# Patient Record
Sex: MALE | Race: BLACK OR AFRICAN AMERICAN | URBAN - METROPOLITAN AREA
[De-identification: names, ages, dates, MRNs, and addresses within clinical notes are randomized per-mention and may not be internally consistent; named-entity substitution may affect disease eponyms.]

---

## 2019-02-06 ENCOUNTER — OFFICE VISIT (OUTPATIENT)
Dept: URGENT CARE | Facility: URGENT CARE | Age: 74
End: 2019-02-06
Payer: COMMERCIAL

## 2019-02-06 VITALS
DIASTOLIC BLOOD PRESSURE: 80 MMHG | HEART RATE: 77 BPM | TEMPERATURE: 98.2 F | SYSTOLIC BLOOD PRESSURE: 187 MMHG | RESPIRATION RATE: 15 BRPM | WEIGHT: 152 LBS | OXYGEN SATURATION: 98 %

## 2019-02-06 DIAGNOSIS — S09.90XA CLOSED HEAD INJURY, INITIAL ENCOUNTER: Primary | ICD-10-CM

## 2019-02-06 DIAGNOSIS — R29.898 LEFT HAND WEAKNESS: ICD-10-CM

## 2019-02-06 DIAGNOSIS — E11.8 TYPE 2 DIABETES MELLITUS WITH COMPLICATION, WITHOUT LONG-TERM CURRENT USE OF INSULIN (H): ICD-10-CM

## 2019-02-06 DIAGNOSIS — I16.0 HYPERTENSIVE URGENCY: ICD-10-CM

## 2019-02-06 PROCEDURE — 99205 OFFICE O/P NEW HI 60 MIN: CPT | Performed by: FAMILY MEDICINE

## 2019-02-07 NOTE — PROGRESS NOTES
Chief complaint: head injury    Accompanied by daughter and son in law    Patient with history of HYPERTENSION and DM    Was walking in the house and tripped and fell on his face   Per patient he may have passed out   Couldn't feel or move his left hand maybe a few minutes   head injury:Yes  loss of consciousness:  Yes  syncope or presyncope: No  chest pain or palpitation: No   blood thinners: No     Patient thinks it's a mechanical fall.     No known Family history - patient does not know or recall.  SOCIAL HISTORY: non smoker. No alcohol use. No drug use     ROS:  as per hpi  denies headache  denies any nausea or vomiting  denies any blurring of vision  denies any otorrhea or rhinorhea  HAD NECK PAIN INITIALLY THEN WENT AWAY   denies any back pain.  denies any chest pain or shortness of breath  denies any joint pain except noted above.  denies any abdominal pain, nausea or vomiting or flank pain  denies any hematuria      No Known Allergies    No past medical history on file.      No current outpatient medications on file prior to visit.  No current facility-administered medications on file prior to visit.     Social History     Tobacco Use     Smoking status: Not on file     Smokeless tobacco: Never Used   Substance Use Topics     Alcohol use: Not on file     Drug use: Not on file       ROS:  review of systems negative except for noted above.   No thoughts of harming self or others.     OBJECTIVE:  BP (!) 205/86   Pulse 77   Temp 98.2  F (36.8  C) (Oral)   Resp 15   Wt 68.9 kg (152 lb)   SpO2 98%    /80   Pulse 77   Temp 98.2  F (36.8  C) (Oral)   Resp 15   Wt 68.9 kg (152 lb)   SpO2 98%    General:   awake, alert, and cooperative.  NAD.   Head: Normocephalic,  Facial abrasions noted   Eyes: Conjunctiva clear,   Right pupil irregular fixed (history of eye surgery)  Left pupil fixed pinpoint  ENT: no periorbital ecchymosis, no otorrhea or rhinorrhea, negative Zabala's sign, no raccoon eyes, no  hematympanum  Heart: Regular rate and rhythm. No murmur.  Lungs: Chest is clear; no wheezes or rales.   Abdomen: soft non-tender. No bruising noted.   Neuro: Alert and oriented - normal speech. Cranial nerves intact, MMT 5/5 all extremities, sensory intact, baseline gait   MS: Using extremities freely , No cervical, thoracic, or lumbar spine tenderness  PSYCH:  Normal affect, normal speech  SKIN: no obvious rashes    ASSESSMENT:    ICD-10-CM    1. Closed head injury, initial encounter S09.90XA    2. Left hand weakness R29.898    3. Type 2 diabetes mellitus with complication, without long-term current use of insulin (H) E11.8    4. Hypertensive urgency I16.0          PLAN:   Recommend ASAP ER evaluation  R/o TIA, rule out intracranial bleed, rule out cervical myelopathy or cervical spine injury  Patient also with elevated blood pressure.   Ambulance called and neck was immobilized using C-collar.   Report given to [facility] ER physician.     Susan Posadas MD

## 2023-05-08 NOTE — NURSING NOTE
Chief Complaint   Patient presents with     Fall     pt tripped and hit his face, DOI 2/6/19 at 4pm       Initial BP (!) 205/86   Pulse 77   Temp 98.2  F (36.8  C) (Oral)   Resp 15   Wt 68.9 kg (152 lb)   SpO2 98%  There is no height or weight on file to calculate BMI.  Medication Reconciliation: complete  Michael Parisi MA    
A positive